# Patient Record
Sex: FEMALE | Race: BLACK OR AFRICAN AMERICAN | NOT HISPANIC OR LATINO | Employment: FULL TIME | RURAL
[De-identification: names, ages, dates, MRNs, and addresses within clinical notes are randomized per-mention and may not be internally consistent; named-entity substitution may affect disease eponyms.]

---

## 2022-09-13 ENCOUNTER — OFFICE VISIT (OUTPATIENT)
Dept: CARDIOLOGY | Facility: CLINIC | Age: 52
End: 2022-09-13

## 2022-09-13 VITALS
HEART RATE: 50 BPM | HEIGHT: 68 IN | SYSTOLIC BLOOD PRESSURE: 144 MMHG | BODY MASS INDEX: 39.4 KG/M2 | WEIGHT: 260 LBS | DIASTOLIC BLOOD PRESSURE: 99 MMHG

## 2022-09-13 DIAGNOSIS — R00.1 BRADYCARDIA, SINUS: Primary | ICD-10-CM

## 2022-09-13 DIAGNOSIS — R07.2 PRECORDIAL PAIN: ICD-10-CM

## 2022-09-13 PROCEDURE — 99204 OFFICE O/P NEW MOD 45 MIN: CPT | Performed by: INTERNAL MEDICINE

## 2022-09-13 RX ORDER — CYCLOBENZAPRINE HCL 10 MG
10 TABLET ORAL 3 TIMES DAILY PRN
COMMUNITY
End: 2023-02-27

## 2022-09-13 RX ORDER — IBUPROFEN 600 MG/1
600 TABLET ORAL EVERY 6 HOURS PRN
COMMUNITY
End: 2023-02-27

## 2022-09-13 RX ORDER — CANDESARTAN 16 MG/1
16 TABLET ORAL DAILY
COMMUNITY

## 2022-09-13 RX ORDER — MELOXICAM 7.5 MG/1
7.5 TABLET ORAL DAILY
COMMUNITY

## 2022-09-13 RX ORDER — INFLUENZA A VIRUS A/SINGAPORE/GP1908/2015 IVR-180A (H1N1) ANTIGEN (PROPIOLACTONE INACTIVATED), INFLUENZA A VIRUS A/SINGAPORE/INFIMH-16-0019/2016 IVR-186 (H3N2) ANTIGEN (PROPIOLACTONE INACTIVATED) AND INFLUENZA B VIRUS B/MARYLAND/15/2016 ANTIGEN (PROPIOLACTONE INACTIVATED) 15; 15; 15 UG/.5ML; UG/.5ML; UG/.5ML
0.5 INJECTION, SUSPENSION INTRAMUSCULAR ONCE
COMMUNITY

## 2022-09-13 RX ORDER — ALPRAZOLAM 0.25 MG/1
0.25 TABLET ORAL 2 TIMES DAILY PRN
COMMUNITY

## 2022-09-13 RX ORDER — LURASIDONE HYDROCHLORIDE 80 MG/1
TABLET, FILM COATED ORAL
COMMUNITY

## 2022-09-13 RX ORDER — ATORVASTATIN CALCIUM 20 MG/1
20 TABLET, FILM COATED ORAL DAILY
COMMUNITY

## 2022-09-13 RX ORDER — FUROSEMIDE 20 MG/1
20 TABLET ORAL 2 TIMES DAILY
COMMUNITY

## 2022-09-13 RX ORDER — PHENAZOPYRIDINE HYDROCHLORIDE 200 MG/1
200 TABLET, FILM COATED ORAL 3 TIMES DAILY PRN
COMMUNITY

## 2022-09-13 RX ORDER — DIAZEPAM 10 MG/1
10 TABLET ORAL 2 TIMES DAILY PRN
COMMUNITY
End: 2023-02-27

## 2022-09-13 RX ORDER — NITROFURANTOIN MACROCRYSTALS 100 MG/1
100 CAPSULE ORAL 4 TIMES DAILY
COMMUNITY
End: 2023-02-27

## 2022-09-13 RX ORDER — PREGABALIN 150 MG/1
150 CAPSULE ORAL 2 TIMES DAILY
COMMUNITY

## 2022-09-13 RX ORDER — FLUOXETINE HYDROCHLORIDE 20 MG/1
20 CAPSULE ORAL DAILY
COMMUNITY

## 2022-09-13 RX ORDER — GABAPENTIN 600 MG/1
600 TABLET ORAL 3 TIMES DAILY
COMMUNITY
End: 2023-02-27

## 2022-09-13 RX ORDER — PHENTERMINE HYDROCHLORIDE 37.5 MG/1
37.5 CAPSULE ORAL EVERY MORNING
COMMUNITY

## 2022-09-13 RX ORDER — MIRTAZAPINE 30 MG/1
30 TABLET, FILM COATED ORAL NIGHTLY
COMMUNITY

## 2022-09-13 RX ORDER — DIETHYLPROPION HYDROCHLORIDE 25 MG/1
TABLET ORAL
COMMUNITY
End: 2023-02-27

## 2022-09-13 RX ORDER — CLONAZEPAM 0.5 MG/1
0.5 TABLET ORAL 2 TIMES DAILY PRN
COMMUNITY

## 2022-09-13 RX ORDER — PILOCARPINE HYDROCHLORIDE 5 MG/1
5 TABLET, FILM COATED ORAL 3 TIMES DAILY
COMMUNITY
End: 2023-02-27

## 2022-09-13 RX ORDER — OXYBUTYNIN CHLORIDE 15 MG/1
15 TABLET, EXTENDED RELEASE ORAL DAILY
COMMUNITY

## 2022-09-13 RX ORDER — FERROUS SULFATE TAB EC 324 MG (65 MG FE EQUIVALENT) 324 (65 FE) MG
324 TABLET DELAYED RESPONSE ORAL
COMMUNITY

## 2022-09-13 RX ORDER — HYDROCODONE BITARTRATE AND ACETAMINOPHEN 7.5; 325 MG/1; MG/1
1 TABLET ORAL EVERY 6 HOURS PRN
COMMUNITY

## 2022-09-13 RX ORDER — CEPHALEXIN 500 MG/1
500 CAPSULE ORAL 2 TIMES DAILY
COMMUNITY

## 2022-09-13 NOTE — PROGRESS NOTES
Chief Complaint  low heart rate    Subjective            Marina FELICIANO presents to [facility] CARDIOLOGY  History of Present Illness    52-year-old white female.  She has no previous cardiac problems in the past.  At a PCP visit recently the patient had observed bradycardia.  She is having some intermittent dizzy episodes.  She reports that that occurs about every few days.  She had an episode at work with dizziness associated with chest pain and she went to the emergency room last week.  Her EKG there showed sinus bradycardia with rate of 54, troponin was negative and she subsequently was discharged for outpatient follow-up.  She is a non-smoker.  No recent or new medication changes.    PMH  Past Medical History:   Diagnosis Date   • CHF (congestive heart failure) (HCC)    • Hyperlipidemia    • Hypertension    • Sleep apnea          SURGICALHX  History reviewed. No pertinent surgical history.     SOC  Social History     Socioeconomic History   • Marital status:    Tobacco Use   • Smoking status: Never Smoker   • Smokeless tobacco: Never Used   Vaping Use   • Vaping Use: Never used   Substance and Sexual Activity   • Alcohol use: Never   • Drug use: Never   • Sexual activity: Defer         FAMHX  Family History   Problem Relation Age of Onset   • No Known Problems Mother    • No Known Problems Father           ALLERGY  No Known Allergies     MEDSCURRENT    Current Outpatient Medications:   •  ALPRAZolam (XANAX) 0.25 MG tablet, Take 0.25 mg by mouth 2 (Two) Times a Day As Needed for Anxiety., Disp: , Rfl:   •  atorvastatin (LIPITOR) 20 MG tablet, Take 20 mg by mouth Daily., Disp: , Rfl:   •  candesartan (ATACAND) 16 MG tablet, Take 16 mg by mouth Daily., Disp: , Rfl:   •  cephalexin (KEFLEX) 500 MG capsule, Take 500 mg by mouth 2 (Two) Times a Day., Disp: , Rfl:   •  clonazePAM (KlonoPIN) 0.5 MG tablet, Take 0.5 mg by mouth 2 (Two) Times a Day As Needed., Disp: , Rfl:   •  cyclobenzaprine  (FLEXERIL) 10 MG tablet, Take 10 mg by mouth 3 (Three) Times a Day As Needed for Muscle Spasms., Disp: , Rfl:   •  diazePAM (VALIUM) 10 MG tablet, Take 10 mg by mouth 2 (Two) Times a Day As Needed for Anxiety., Disp: , Rfl:   •  Diethylpropion HCl 25 MG tablet, Take  by mouth., Disp: , Rfl:   •  ferrous sulfate 324 (65 Fe) MG tablet delayed-release EC tablet, Take 324 mg by mouth Daily With Breakfast., Disp: , Rfl:   •  FLUoxetine (PROzac) 20 MG capsule, Take 20 mg by mouth Daily., Disp: , Rfl:   •  furosemide (LASIX) 20 MG tablet, Take 20 mg by mouth 2 (Two) Times a Day., Disp: , Rfl:   •  gabapentin (NEURONTIN) 600 MG tablet, Take 600 mg by mouth 3 (Three) Times a Day., Disp: , Rfl:   •  HYDROcodone-acetaminophen (NORCO) 7.5-325 MG per tablet, Take 1 tablet by mouth Every 6 (Six) Hours As Needed for Moderate Pain., Disp: , Rfl:   •  ibuprofen (ADVIL,MOTRIN) 600 MG tablet, Take 600 mg by mouth Every 6 (Six) Hours As Needed for Mild Pain., Disp: , Rfl:   •  Influenza Virus Vaccine Split (Afluria) injection, Inject 0.5 mL into the appropriate muscle as directed by prescriber 1 (One) Time., Disp: , Rfl:   •  Lurasidone HCl (Latuda) 80 MG tablet tablet, Take  by mouth., Disp: , Rfl:   •  meloxicam (MOBIC) 7.5 MG tablet, Take 7.5 mg by mouth Daily., Disp: , Rfl:   •  mirtazapine (REMERON) 30 MG tablet, Take 30 mg by mouth Every Night., Disp: , Rfl:   •  nitrofurantoin (MACRODANTIN) 100 MG capsule, Take 100 mg by mouth 4 (Four) Times a Day., Disp: , Rfl:   •  nystatin (MYCOSTATIN) 100,000 unit/mL suspension, Swish and swallow 500,000 Units 4 (Four) Times a Day., Disp: , Rfl:   •  oxybutynin XL (DITROPAN XL) 15 MG 24 hr tablet, Take 15 mg by mouth Daily., Disp: , Rfl:   •  phenazopyridine (PYRIDIUM) 200 MG tablet, Take 200 mg by mouth 3 (Three) Times a Day As Needed for Bladder Spasms., Disp: , Rfl:   •  phentermine 37.5 MG capsule, Take 37.5 mg by mouth Every Morning., Disp: , Rfl:   •  pilocarpine (SALAGEN) 5 MG tablet,  "Take 5 mg by mouth 3 (Three) Times a Day., Disp: , Rfl:   •  pregabalin (LYRICA) 150 MG capsule, Take 150 mg by mouth 2 (Two) Times a Day., Disp: , Rfl:       Review of Systems   Constitutional: Positive for malaise/fatigue.   HENT: Negative.    Eyes: Negative.    Cardiovascular: Positive for chest pain and dyspnea on exertion.   Respiratory: Negative.    Endocrine: Negative.    Hematologic/Lymphatic: Negative.    Skin: Negative.    Musculoskeletal: Negative.    Gastrointestinal: Negative.    Genitourinary: Negative.    Neurological: Positive for dizziness.   Psychiatric/Behavioral: Negative.         Objective     /99   Pulse 50   Ht 172.7 cm (68\")   Wt 118 kg (260 lb)   BMI 39.53 kg/m²       General Appearance:   · well developed  · well nourished, obese  HENT:   · oropharynx moist  · lips not cyanotic  Neck:  · thyroid not enlarged  · supple  Respiratory:  · no respiratory distress  · normal breath sounds  · no rales  Cardiovascular:  · no jugular venous distention  · regular rhythm  · apical impulse normal  · S1 normal, S2 normal  · no S3, no S4   · no murmur  · no rub, no thrill  · carotid pulses normal; no bruit  · pedal pulses normal  · lower extremity edema: none    Musculoskeletal:  · no clubbing of fingers.   · normocephalic, head atraumatic  Skin:   · warm, dry  Psychiatric:  · judgement and insight appropriate  · normal mood and affect      Result Review :             Data reviewed: Primary care records reviewed, EKG reviewed from the emergency room, laboratory studies reviewed     Procedures                Assessment and Plan        ASSESSMENT:  Encounter Diagnoses   Name Primary?   • Bradycardia, sinus Yes   • Precordial pain          PLAN:    1.  Ms. Kirk has sinus bradycardia, she does complain of intermittent dizziness but it is unclear if this correlates with bradycardia.  A 14-day event monitor was scheduled to evaluate further.  2.  Additionally she has chest pain, not entirely typical " for angina.  Her emergency room work-up was negative.  Stress imaging will be scheduled since the patient is unable to walk on the treadmill we will do pharmacologic stress testing, this will be nuclear imaging since stress echo is not available in her area.  3.  We will discuss diagnostic results when available          Patient was given instructions and counseling regarding her condition or for health maintenance advice. Please see specific information pulled into the AVS if appropriate.             BRIANNA Ziegler MD  9/13/2022    10:15 EDT

## 2022-09-28 DIAGNOSIS — R07.2 PRECORDIAL PAIN: ICD-10-CM

## 2022-09-29 ENCOUNTER — TELEPHONE (OUTPATIENT)
Dept: CARDIOLOGY | Facility: CLINIC | Age: 52
End: 2022-09-29

## 2022-09-29 NOTE — TELEPHONE ENCOUNTER
----- Message from Una Head RN sent at 9/29/2022 12:33 PM EDT -----    ----- Message -----  From: BRIANNA Ziegler MD  Sent: 9/29/2022  12:13 PM EDT  To: Una Head RN    SPECT showed normal cardiac blood flow, no evidence of blocked arteries with normal heart muscle function    Plan is await event monitor

## 2022-10-07 ENCOUNTER — TELEPHONE (OUTPATIENT)
Dept: CARDIOLOGY | Facility: CLINIC | Age: 52
End: 2022-10-07

## 2022-10-07 NOTE — TELEPHONE ENCOUNTER
----- Message from BRIANNA Ziegler MD sent at 10/7/2022  8:14 AM EDT -----  Heart monitor showed normal rhythm, average heart rate 70.  We already knew that she occasionally has some slow heart rates, but these do not correlate with the symptoms that she reported during the monitoring.  Otherwise there was no significant arrhythmias    Recent stress test also appeared normal    At this time I think her cardiac function looks good, I do not suspect that her heart rate is causing symptoms.  No additional testing is needed currently.  She can be followed as needed

## 2023-01-28 ENCOUNTER — OUTSIDE FACILITY SERVICE (OUTPATIENT)
Dept: CARDIOLOGY | Facility: CLINIC | Age: 53
End: 2023-01-28
Payer: COMMERCIAL

## 2023-01-28 PROCEDURE — 93306 TTE W/DOPPLER COMPLETE: CPT | Performed by: INTERNAL MEDICINE

## 2023-02-27 ENCOUNTER — OFFICE VISIT (OUTPATIENT)
Dept: CARDIOLOGY | Facility: CLINIC | Age: 53
End: 2023-02-27
Payer: COMMERCIAL

## 2023-02-27 VITALS
SYSTOLIC BLOOD PRESSURE: 118 MMHG | HEART RATE: 59 BPM | WEIGHT: 221 LBS | BODY MASS INDEX: 33.49 KG/M2 | HEIGHT: 68 IN | DIASTOLIC BLOOD PRESSURE: 80 MMHG

## 2023-02-27 DIAGNOSIS — R00.1 BRADYCARDIA, SINUS: Primary | ICD-10-CM

## 2023-02-27 DIAGNOSIS — Z79.899 POLYPHARMACY: ICD-10-CM

## 2023-02-27 PROCEDURE — 99213 OFFICE O/P EST LOW 20 MIN: CPT

## 2023-02-27 RX ORDER — PANTOPRAZOLE SODIUM 40 MG/1
40 GRANULE, DELAYED RELEASE ORAL
COMMUNITY

## 2023-02-27 NOTE — PROGRESS NOTES
Chief Complaint  Slow Heart Rate, Hospital Follow Up Visit (Pt was seen at  for bradycardia and seizures on 2/23/23.), Leg Swelling (Bilateral leg edema.), and Shortness of Breath    Subjective        History of Present Illness  Marina Kirk presents to [facility] CARDIOLOGY for follow up. Past medical history is outlined below, remarkable for sinus bradycardia, hypertension, hyperlipidemia, obstructive sleep apnea.  She complains of fatigue that occurs mostly on the weekend.  She has energy throughout the week but does not want to get out of bed when the week and rolls around.  This has been ongoing for approximately 4 weeks.  She reports that her heart rate has been dropping down into the 40s.  She has known sinus bradycardia.  She was diagnosed with COVID 2 weeks ago.  She was concerned about unintentional weight loss but has had she had a gastric sleeve and has been losing weight as a result.  She denies any chest pain, dyspnea, orthopnea, edema, syncope.    PMH  Past Medical History:   Diagnosis Date   • CHF (congestive heart failure) (HCC)    • Hyperlipidemia    • Hypertension    • Sleep apnea        ALLERGY  No Known Allergies     SURGICALHX  History reviewed. No pertinent surgical history.     SOC  Social History     Socioeconomic History   • Marital status:    Tobacco Use   • Smoking status: Never   • Smokeless tobacco: Never   Vaping Use   • Vaping Use: Never used   Substance and Sexual Activity   • Alcohol use: Never   • Drug use: Never   • Sexual activity: Defer       FAMHX  Family History   Problem Relation Age of Onset   • No Known Problems Mother    • No Known Problems Father         MEDSIGONLY  Current Outpatient Medications on File Prior to Visit   Medication Sig   • ALPRAZolam (XANAX) 0.25 MG tablet Take 0.25 mg by mouth 2 (Two) Times a Day As Needed for Anxiety.   • atorvastatin (LIPITOR) 20 MG tablet Take 20 mg by mouth Daily.   • candesartan (ATACAND) 16 MG tablet Take  16 mg by mouth Daily.   • cephalexin (KEFLEX) 500 MG capsule Take 500 mg by mouth 2 (Two) Times a Day.   • clonazePAM (KlonoPIN) 0.5 MG tablet Take 0.5 mg by mouth 2 (Two) Times a Day As Needed.   • ferrous sulfate 324 (65 Fe) MG tablet delayed-release EC tablet Take 324 mg by mouth Daily With Breakfast.   • FLUoxetine (PROzac) 20 MG capsule Take 20 mg by mouth Daily.   • furosemide (LASIX) 20 MG tablet Take 20 mg by mouth 2 (Two) Times a Day.   • HYDROcodone-acetaminophen (NORCO) 7.5-325 MG per tablet Take 1 tablet by mouth Every 6 (Six) Hours As Needed for Moderate Pain.   • Influenza Virus Vaccine Split (Afluria) injection Inject 0.5 mL into the appropriate muscle as directed by prescriber 1 (One) Time. Vaccinated .   • Lurasidone HCl (LATUDA) 80 MG tablet tablet Take  by mouth.   • meloxicam (MOBIC) 7.5 MG tablet Take 7.5 mg by mouth Daily.   • mirtazapine (REMERON) 30 MG tablet Take 30 mg by mouth Every Night.   • oxybutynin XL (DITROPAN XL) 15 MG 24 hr tablet Take 15 mg by mouth Daily.   • pantoprazole (PROTONIX) 40 MG pack packet Take 40 mg by mouth Every Morning Before Breakfast.   • phenazopyridine (PYRIDIUM) 200 MG tablet Take 200 mg by mouth 3 (Three) Times a Day As Needed for Bladder Spasms.   • phentermine 37.5 MG capsule Take 37.5 mg by mouth Every Morning.   • pregabalin (LYRICA) 150 MG capsule Take 150 mg by mouth 2 (Two) Times a Day.   • [DISCONTINUED] cyclobenzaprine (FLEXERIL) 10 MG tablet Take 10 mg by mouth 3 (Three) Times a Day As Needed for Muscle Spasms.   • [DISCONTINUED] diazePAM (VALIUM) 10 MG tablet Take 10 mg by mouth 2 (Two) Times a Day As Needed for Anxiety.   • [DISCONTINUED] Diethylpropion HCl 25 MG tablet Take  by mouth.   • [DISCONTINUED] gabapentin (NEURONTIN) 600 MG tablet Take 600 mg by mouth 3 (Three) Times a Day.   • [DISCONTINUED] ibuprofen (ADVIL,MOTRIN) 600 MG tablet Take 600 mg by mouth Every 6 (Six) Hours As Needed for Mild Pain.   • [DISCONTINUED] nitrofurantoin  "(MACRODANTIN) 100 MG capsule Take 100 mg by mouth 4 (Four) Times a Day.   • [DISCONTINUED] nystatin (MYCOSTATIN) 100,000 unit/mL suspension Swish and swallow 500,000 Units 4 (Four) Times a Day.   • [DISCONTINUED] pilocarpine (SALAGEN) 5 MG tablet Take 5 mg by mouth 3 (Three) Times a Day.     No current facility-administered medications on file prior to visit.       Objective   Vitals:    02/27/23 1123   BP: 118/80   Pulse: 59   Weight: 100 kg (221 lb)   Height: 172.7 cm (68\")         Physical Exam  Constitutional:       General: She is awake. She is not in acute distress.     Appearance: Normal appearance. She is obese.   HENT:      Head: Normocephalic.      Nose: Nose normal. No congestion.   Eyes:      Extraocular Movements: Extraocular movements intact.      Conjunctiva/sclera: Conjunctivae normal.      Pupils: Pupils are equal, round, and reactive to light.   Neck:      Thyroid: No thyromegaly.      Vascular: No JVD.   Cardiovascular:      Rate and Rhythm: Normal rate and regular rhythm.      Chest Wall: PMI is not displaced.      Pulses: Normal pulses.      Heart sounds: Normal heart sounds, S1 normal and S2 normal. No murmur heard.    No friction rub. No gallop. No S3 or S4 sounds.   Pulmonary:      Effort: Pulmonary effort is normal.      Breath sounds: Normal breath sounds. No wheezing, rhonchi or rales.   Abdominal:      General: Bowel sounds are normal.      Palpations: Abdomen is soft.      Tenderness: There is no abdominal tenderness.   Musculoskeletal:      Cervical back: No tenderness.      Right lower leg: No edema.      Left lower leg: No edema.   Lymphadenopathy:      Cervical: No cervical adenopathy.   Skin:     General: Skin is warm and dry.      Capillary Refill: Capillary refill takes less than 2 seconds.      Coloration: Skin is not cyanotic.      Findings: No petechiae or rash.      Nails: There is no clubbing.   Neurological:      Mental Status: She is alert.   Psychiatric:         Mood and " Affect: Mood normal.         Behavior: Behavior is cooperative.           Result Review     The following data was reviewed by SHAKA Gutierrez on 02/27/23.    No results found for: PROBNP  CMP    CMP 7/7/22 2/10/23   Glucose  93   eGFR Non  Am >60    eGFR African Am >60    Potassium  3.4 (A)   Chloride  110 (A)   (A) Abnormal value            CBC w/diff    CBC w/Diff 11/1/22 2/10/23   WBC 8.62 6.59   RBC 4.00 3.95   Hemoglobin 11.2 11.4   Hematocrit 35.8 35.9   MCV 90 91   MCH 28.0 28.9   MCHC 31.3 31.8   RDW 13.9 14.1   Platelets 219 212   Neutrophil Rel % 53.0 38.0   Immature Granulocyte Rel % 0.0 0.0   Lymphocyte Rel % 33.0 47.0   Monocyte Rel % 9.0 8.0   Eosinophil Rel % 4.0 6.0   Basophil Rel % 1.0 1.0                       Assessment and Plan   Diagnoses and all orders for this visit:    1. Bradycardia, sinus (Primary)    2. Polypharmacy    She is concerned that her fatigue that occurs only on the weekend is related to her history of bradycardia.  We discussed the results of her recent Holter monitor.  She was reassured.  Is possible that the fatigue that she is experiencing is related to her recent diagnosis of COVID.  She does not have any dizziness, lightheadedness, syncope or presyncope.  We can consider repeating her Holter monitor if her symptoms worsen or fail to improve.      She is on a multitude of medications that can be contributing to her symptoms.  She was encouraged to discuss side effects and potential drug interactions with the prescribing physicians.    Follow Up   Return if symptoms worsen or fail to improve.    Patient was given instructions and counseling regarding her condition or for health maintenance advice. Please see specific information pulled into the AVS if appropriate.     SHAKA Gutierrez  02/27/23  21:45 EST    Dictated Utilizing Dragon Dictation